# Patient Record
Sex: MALE | Race: WHITE | NOT HISPANIC OR LATINO | Employment: FULL TIME | URBAN - METROPOLITAN AREA
[De-identification: names, ages, dates, MRNs, and addresses within clinical notes are randomized per-mention and may not be internally consistent; named-entity substitution may affect disease eponyms.]

---

## 2018-10-15 DIAGNOSIS — I25.10 CORONARY ARTERY DISEASE, ANGINA PRESENCE UNSPECIFIED, UNSPECIFIED VESSEL OR LESION TYPE, UNSPECIFIED WHETHER NATIVE OR TRANSPLANTED HEART: Primary | ICD-10-CM

## 2018-11-02 ENCOUNTER — TELEPHONE (OUTPATIENT)
Dept: CARDIOLOGY | Facility: CLINIC | Age: 68
End: 2018-11-02

## 2018-11-15 ENCOUNTER — CLINICAL SUPPORT (OUTPATIENT)
Dept: CARDIOLOGY | Facility: CLINIC | Age: 68
End: 2018-11-15
Payer: COMMERCIAL

## 2018-11-15 ENCOUNTER — OFFICE VISIT (OUTPATIENT)
Dept: CARDIOLOGY | Facility: CLINIC | Age: 68
End: 2018-11-15
Payer: COMMERCIAL

## 2018-11-15 VITALS
WEIGHT: 264.13 LBS | OXYGEN SATURATION: 98 % | HEIGHT: 71 IN | DIASTOLIC BLOOD PRESSURE: 70 MMHG | BODY MASS INDEX: 36.98 KG/M2 | SYSTOLIC BLOOD PRESSURE: 148 MMHG | HEART RATE: 73 BPM

## 2018-11-15 DIAGNOSIS — I42.8 OTHER CARDIOMYOPATHIES: ICD-10-CM

## 2018-11-15 DIAGNOSIS — E78.00 PURE HYPERCHOLESTEROLEMIA: ICD-10-CM

## 2018-11-15 DIAGNOSIS — G47.33 OSA (OBSTRUCTIVE SLEEP APNEA): ICD-10-CM

## 2018-11-15 DIAGNOSIS — I25.10 CORONARY ARTERY DISEASE, ANGINA PRESENCE UNSPECIFIED, UNSPECIFIED VESSEL OR LESION TYPE, UNSPECIFIED WHETHER NATIVE OR TRANSPLANTED HEART: ICD-10-CM

## 2018-11-15 DIAGNOSIS — I25.10 CORONARY ARTERY DISEASE INVOLVING NATIVE CORONARY ARTERY OF NATIVE HEART WITHOUT ANGINA PECTORIS: ICD-10-CM

## 2018-11-15 DIAGNOSIS — E66.9 OBESITY, CLASS II, BMI 35-39.9: ICD-10-CM

## 2018-11-15 DIAGNOSIS — I42.8 OTHER CARDIOMYOPATHIES: Primary | ICD-10-CM

## 2018-11-15 DIAGNOSIS — I10 ESSENTIAL HYPERTENSION: ICD-10-CM

## 2018-11-15 LAB
CV STRESS BASE HR: 50
DIASTOLIC BLOOD PRESSURE: 87
NUC REST DIASTOLIC VOLUME INDEX: 69
NUC REST EJECTION FRACTION: 64
NUC REST SYSTOLIC VOLUME INDEX: 25
NUC STRESS DIASTOLIC VOLUME INDEX: 97
NUC STRESS EJECTION FRACTION: 80 %
NUC STRESS SYSTOLIC VOLUME INDEX: 19
OHS CV CPX 85 PERCENT MAX PREDICTED HEART RATE MALE: 129
OHS CV CPX MAX PREDICTED HEART RATE: 152
OHS CV CPX PATIENT IS FEMALE: 0
OHS CV CPX PATIENT IS MALE: 1
OHS CV CPX PEAK DIASTOLIC BLOOD PRESSURE: 59 MMHG
OHS CV CPX PEAK HEAR RATE: 68
OHS CV CPX PEAK RATE PRESSURE PRODUCT: 9996
OHS CV CPX PEAK SYSTOLIC BLOOD PRESSURE: 147
OHS CV CPX PERCENT MAX PREDICTED HEART RATE ACHIEVED: 45
OHS CV CPX RATE PRESSURE PRODUCT PRESENTING: 8300
SYSTOLIC BLOOD PRESSURE: 166

## 2018-11-15 PROCEDURE — 99205 OFFICE O/P NEW HI 60 MIN: CPT | Mod: 25,S$GLB,, | Performed by: INTERNAL MEDICINE

## 2018-11-15 PROCEDURE — 99999 PR PBB SHADOW E&M-EST. PATIENT-LVL III: CPT | Mod: PBBFAC,,, | Performed by: INTERNAL MEDICINE

## 2018-11-15 PROCEDURE — 78492 MYOCRD IMG PET MLT RST&STRS: CPT | Mod: S$GLB,,, | Performed by: INTERNAL MEDICINE

## 2018-11-15 PROCEDURE — A9555 RB82 RUBIDIUM: HCPCS | Mod: S$GLB,,, | Performed by: INTERNAL MEDICINE

## 2018-11-15 RX ORDER — NITROGLYCERIN 0.4 MG/1
0.4 TABLET SUBLINGUAL EVERY 5 MIN PRN
COMMUNITY

## 2018-11-15 RX ORDER — LISINOPRIL 2.5 MG/1
TABLET ORAL
Refills: 1 | COMMUNITY
Start: 2018-11-03 | End: 2018-11-15 | Stop reason: ALTCHOICE

## 2018-11-15 RX ORDER — ASPIRIN 81 MG/1
81 TABLET ORAL DAILY
COMMUNITY

## 2018-11-15 RX ORDER — DIPYRIDAMOLE 5 MG/ML
60 INJECTION INTRAVENOUS
Status: COMPLETED | OUTPATIENT
Start: 2018-11-15 | End: 2018-11-15

## 2018-11-15 RX ORDER — LOSARTAN POTASSIUM 25 MG/1
25 TABLET ORAL DAILY
Qty: 90 TABLET | Refills: 3 | Status: SHIPPED | OUTPATIENT
Start: 2018-11-15 | End: 2019-11-08 | Stop reason: SDUPTHER

## 2018-11-15 RX ORDER — FUROSEMIDE 20 MG/1
20 TABLET ORAL 2 TIMES DAILY
COMMUNITY

## 2018-11-15 RX ORDER — EZETIMIBE 10 MG/1
10 TABLET ORAL DAILY
Qty: 90 TABLET | Refills: 3 | Status: SHIPPED | OUTPATIENT
Start: 2018-11-15 | End: 2019-11-08 | Stop reason: SDUPTHER

## 2018-11-15 RX ORDER — IBUPROFEN 200 MG
200 TABLET ORAL EVERY 6 HOURS PRN
COMMUNITY

## 2018-11-15 RX ORDER — ATORVASTATIN CALCIUM 10 MG/1
10 TABLET, FILM COATED ORAL DAILY
COMMUNITY

## 2018-11-15 RX ORDER — CLOPIDOGREL BISULFATE 75 MG/1
TABLET ORAL
Refills: 0 | COMMUNITY
Start: 2018-11-05

## 2018-11-15 RX ORDER — METOPROLOL TARTRATE 50 MG/1
TABLET ORAL
Refills: 0 | COMMUNITY
Start: 2018-11-05

## 2018-11-15 RX ORDER — NAPROXEN SODIUM 220 MG
220 TABLET ORAL 2 TIMES DAILY WITH MEALS
COMMUNITY

## 2018-11-15 RX ADMIN — DIPYRIDAMOLE 60 MG: 5 INJECTION INTRAVENOUS at 11:11

## 2018-11-15 NOTE — PATIENT INSTRUCTIONS
"I strongly encourage you to adopt a Plant Based, whole food (unprocessed) dietary strategy.  I recommend that you watch the movie "[place] Over Knives" as well as visit [email or web address] for additional resources.  Also, I advise you to read the book "Prevent and Reverse Heart Disease" by Dr. Onofre for specific instructions and recipes.      Use [email or web address]  Get the [employer]  "

## 2018-11-15 NOTE — PROGRESS NOTES
"Subjective:   Patient ID:  Chico Ryan is a 68 y.o. male who presents for follow-up of Coronary Artery Disease      HPI:   Pt is here for a second opinion regarding CABG.  I have reviewed his outpt notes and angiogram films.  He has HTN, HPL and a h/o CAD s/p PCI of LAD in 3-2003.  At the time of his PCI, he was having classic angina (chest pressure that "felt like an elephant").  Since that time, he has had no recurrent anginal sxs.  His recent workup was prompted by a few episodes of chest pain that was sharp ("felt like a poke"), lasted for less than 5 seconds and was relieved with either a belch or shift in position. It does not appear that any stress testing was performed however he did have an angiogram. The angiogram demonstrated diffuse disease in his LAD and RCA but no obvious appearing obstructive lesions. There did appear to be high grade stenosis of an OM branch. The was also LM disease.  LM is long as associated with a long plague that appears~50%.  IVUS or FFR was not performed at the time of angio.    He adamantly denies any sxs that are consistent with angina or an anginal equivalent.  He denies any exertional chest discomfort, exertional dyspnea, palpitations, TIA's, syncope or presyncope    He does not have a regimented exercise program, however does lead an active lifestyle and is not limited by any cardiac symptoms,    His only c/o is fatigue.  He also has MILA and has started to use CPAP.    ECG - sinus bell otherwise normal      PET from today was   1) without perfusion abnormalities,   2) Whole heart absolute myocardial perfusion (cc/min/gm) averaged 0.55 at rest (which is low-normal), 1.11 at stress (which is moderately reduced), and CFR was 2.04 (which is good for age)  3) Stress flow is reduced diffusely throughout the heart and consistent with the presence of CAD in all 3 vascular territories, but above thresholds for ischemia. CFR is good for age and associated with good " "prognosis.  4) Gated PET perfusion images showed a normal ejection fraction of 64% at rest and improves to 80% at stress. (normal is 53-65%)  5) LV cavity size at rest is normal. LV cavity at stress is normal.  6)There are no wall motion abnormalites at rest or stress. At stress, global wall motion is hyperdynamic and without regional wall motion abnormalites  7)The EKG portion of this study is negative for myocardial ischemia.  · There were no arrhythmias during stress.  · The patient reported no symptoms during the stress test.        Vitals:    11/15/18 1301   BP: (!) 148/70   BP Location: Left arm   Patient Position: Sitting   BP Method: Large (Manual)   Pulse: 73   SpO2: 98%   Weight: 119.8 kg (264 lb 1.8 oz)   Height: 5' 11" (1.803 m)     Body mass index is 36.84 kg/m².  CrCl cannot be calculated (No order found.).    No results found for: NA, K, CL, CO2, BUN, CREATININE, GLU, HGBA1C, MG, AST, ALT, ALBUMIN, PROT, BILITOT, WBC, HGB, HCT, MCV, PLT, INR, PSA, TSH, CHOL, HDL, LDLCALC, TRIG    Current Outpatient Medications   Medication Sig    aspirin (ECOTRIN) 81 MG EC tablet Take 81 mg by mouth once daily.    atorvastatin (LIPITOR) 10 MG tablet Take 10 mg by mouth once daily.    furosemide (LASIX) 20 MG tablet Take 20 mg by mouth 2 (two) times daily.    ibuprofen (ADVIL) 200 MG tablet Take 200 mg by mouth every 6 (six) hours as needed for Pain.    naproxen sodium (ANAPROX) 220 MG tablet Take 220 mg by mouth 2 (two) times daily with meals.    nitroGLYCERIN (NITROSTAT) 0.4 MG SL tablet Place 0.4 mg under the tongue every 5 (five) minutes as needed for Chest pain.    clopidogrel (PLAVIX) 75 mg tablet TK 1 T PO QD    ezetimibe (ZETIA) 10 mg tablet Take 1 tablet (10 mg total) by mouth once daily.    losartan (COZAAR) 25 MG tablet Take 1 tablet (25 mg total) by mouth once daily.    metoprolol tartrate (LOPRESSOR) 50 MG tablet TK 1 T PO BID WF     No current facility-administered medications for this visit.  "       Review of Systems   Constitution: Positive for malaise/fatigue. Negative for decreased appetite, weakness, weight gain and weight loss.   Eyes: Negative for visual disturbance.   Cardiovascular: Negative for chest pain, claudication, dyspnea on exertion, irregular heartbeat, orthopnea, palpitations, paroxysmal nocturnal dyspnea and syncope.   Respiratory: Negative for cough, shortness of breath and snoring.    Skin: Negative for rash.   Musculoskeletal: Negative for arthritis, muscle cramps, muscle weakness and myalgias.   Gastrointestinal: Negative for abdominal pain, anorexia, change in bowel habit and nausea.   Genitourinary: Negative for dysuria and frequency.   Neurological: Negative for excessive daytime sleepiness, dizziness, headaches, loss of balance and numbness.   Psychiatric/Behavioral: Negative for depression.       Objective:   Physical Exam   Constitutional: He is oriented to person, place, and time. He appears well-developed and well-nourished.   HENT:   Head: Normocephalic and atraumatic.   Cardiovascular: Normal rate, regular rhythm, normal heart sounds and intact distal pulses. Exam reveals no gallop and no friction rub.   No murmur heard.  Pulmonary/Chest: Effort normal and breath sounds normal.   Neurological: He is alert and oriented to person, place, and time.   Psychiatric: He has a normal mood and affect. His behavior is normal. Judgment and thought content normal.       Assessment:     1. Coronary artery disease involving native coronary artery of native heart without angina pectoris    2. Essential hypertension    3. Pure hypercholesterolemia    4. MILA (obstructive sleep apnea)    5. Obesity, Class II, BMI 35-39.9        Plan:   We had a lengthy discussion regarding CAD, ACS, progression and regression of disease, indications for revascularization and risk/benefit for revascularization in stable CAD.  Base off of his PET, I do not recommend CABG or revascularization at this time.  He  "has no high risk features on his exam.  LVEF augments with stress and he has no regions with severely reduced coronary flow capacity.  His scan is consistent with moderately-advanced CAD as global stress flow is 1.11 cc/min/g.  CFR is adequate at 2.0.    I have recommended 2) intense medical therapy for risk factors and 2 severe lifestyle changes.  His LDL is ~ 130-140 and he is intolerant to high dosages of statins.  He has tried crestor and lipitor but can't take everyday due to myalgias.  He is taking statin ~ 3 times a week.  Will add zetia and check labs in ~ 6 wks.  If LDL>70 at that time, will add PCSK9 inhibitors.  Change ACE to ARB given new data and risk of lung CA.  BPs not at goal (target <120 if tolerated.  Start losartan 25 mg daily and check bmp ~ 1-2 weeks.  Pt will send me a BP log.  We discussed my typical lifestyle and dietary recs.  We had a lengthy discussion about nutrition and diet (>30 minutes) as it pertains to the pathophysiology of CAD and CAD risk factors.  I have strongly encouraged patient to adopt a Plant Based, whole food (unprocessed) dietary strategy.  I have recommended to watch the movie "[place] Over Knives" as well as visit [email or web address] for additional resources.  Also, I advised patient to read the books "Prevent and Reverse Heart Disease" by Dr. Onofre.       CC -primary cardiologist Lashell Riley MD [facility] [place], [place]  Fax [phone]      Orders Placed This Encounter   Procedures    Lipid panel    Comprehensive metabolic panel    Basic metabolic panel                                                         "

## 2018-12-25 ENCOUNTER — PATIENT MESSAGE (OUTPATIENT)
Dept: CARDIOLOGY | Facility: CLINIC | Age: 68
End: 2018-12-25

## 2018-12-26 ENCOUNTER — PATIENT MESSAGE (OUTPATIENT)
Dept: CARDIOLOGY | Facility: CLINIC | Age: 68
End: 2018-12-26

## 2019-11-08 DIAGNOSIS — I25.10 CORONARY ARTERY DISEASE INVOLVING NATIVE CORONARY ARTERY OF NATIVE HEART WITHOUT ANGINA PECTORIS: ICD-10-CM

## 2019-11-08 DIAGNOSIS — I10 ESSENTIAL HYPERTENSION: ICD-10-CM

## 2019-11-08 RX ORDER — EZETIMIBE 10 MG/1
10 TABLET ORAL DAILY
Qty: 90 TABLET | Refills: 3 | Status: SHIPPED | OUTPATIENT
Start: 2019-11-08 | End: 2020-11-07

## 2019-11-08 RX ORDER — LOSARTAN POTASSIUM 25 MG/1
25 TABLET ORAL DAILY
Qty: 90 TABLET | Refills: 3 | Status: SHIPPED | OUTPATIENT
Start: 2019-11-08